# Patient Record
Sex: FEMALE | Race: WHITE | Employment: UNEMPLOYED | URBAN - METROPOLITAN AREA
[De-identification: names, ages, dates, MRNs, and addresses within clinical notes are randomized per-mention and may not be internally consistent; named-entity substitution may affect disease eponyms.]

---
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
## 2021-09-13 ENCOUNTER — NURSE TRIAGE (OUTPATIENT)
Dept: OTHER | Facility: CLINIC | Age: 21
End: 2021-09-13

## 2021-09-13 ENCOUNTER — OFFICE VISIT (OUTPATIENT)
Dept: PRIMARY CARE CLINIC | Age: 21
End: 2021-09-13
Payer: COMMERCIAL

## 2021-09-13 VITALS
HEART RATE: 83 BPM | WEIGHT: 171 LBS | OXYGEN SATURATION: 98 % | BODY MASS INDEX: 28.49 KG/M2 | HEIGHT: 65 IN | TEMPERATURE: 98 F | RESPIRATION RATE: 16 BRPM | DIASTOLIC BLOOD PRESSURE: 77 MMHG | SYSTOLIC BLOOD PRESSURE: 117 MMHG

## 2021-09-13 DIAGNOSIS — M79.671 RIGHT FOOT PAIN: Primary | ICD-10-CM

## 2021-09-13 PROCEDURE — 99213 OFFICE O/P EST LOW 20 MIN: CPT | Performed by: INTERNAL MEDICINE

## 2021-09-13 NOTE — PROGRESS NOTES
HISTORY OF PRESENT ILLNESS  Lashell Rodriguez is a 21 y.o. female. Patient reports that around 430 today she ran her foot over with a patient bed. Reports that continued to work. Has pain, but is able to walk ok. Did take Advil about 40 minutes prior to coming in. No swelling, bruising or loss of sensation. HPI    Review of Systems   Constitutional: Negative. Respiratory: Negative. Cardiovascular: Negative. Musculoskeletal: Positive for joint pain. Neurological: Negative for tingling and sensory change. Physical Exam  Vitals and nursing note reviewed. Cardiovascular:      Rate and Rhythm: Normal rate and regular rhythm. Pulmonary:      Effort: Pulmonary effort is normal.      Breath sounds: Normal breath sounds. Musculoskeletal:      Right foot: Normal range of motion and normal capillary refill. No swelling or deformity. Normal pulse. Comments: Reports some pain on internal rotation. Able to bear weight    Skin:     General: Skin is warm. Neurological:      Mental Status: She is alert and oriented to person, place, and time. ASSESSMENT and PLAN  Diagnoses and all orders for this visit:    1. Right foot pain     - take the remainder of the day off     - return home to rest, ICE and elevated. - encouraged to take NSAID 600-800 mg every 8 hours. - will apply foot ankle/brace for stability     Follow-up and Dispositions    · Return if symptoms worsen or fail to improve, for Follow up.        reviewed medications and side effects in detail

## 2021-09-13 NOTE — PROGRESS NOTES
Chief Complaint   Patient presents with    Foot Pain     Patient in room #5 with complaints of right foot pain after stretcher ran over foot today around 430 pm

## 2021-09-13 NOTE — TELEPHONE ENCOUNTER
Reason for Disposition   [1] Numbness (new loss of sensation) of toe(s) AND [2] present now    Answer Assessment - Initial Assessment Questions  1. MECHANISM: \"How did the injury happen? 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 ONSET: \"When did the injury happen? \" (Minutes or hours ago)       Approx. 90 mins ago    3. LOCATION: \"Where is the injury located? \"       Right foot, edge near pinky toes    4. APPEARANCE of INJURY: \"What does the injury look like? \"       Patient describes mild redness and swelling to the 4th and 5th toe    5. WEIGHT-BEARING: \"Can you put weight on that foot? \" \"Can you walk (four steps or more)? \"        Patient able to walk and put weight on the foot    6. SIZE: For cuts, bruises, or swelling, ask: \"How large is it? \" (e.g., inches or centimeters;  entire joint)      Extends the 4th toe and 5th toe    7. PAIN: \"Is there pain? \" If so, ask: \"How bad is the pain? \"    (e.g., Scale 1-10; or mild, moderate, severe)      5/10    8. TETANUS: For any breaks in the skin, ask: \"When was the last tetanus booster? \"      N/a    9. OTHER SYMPTOMS: \"Do you have any other symptoms? \"       Numbness in the 4th and 5th toe    10. PREGNANCY: \"Is there any chance you are pregnant? \" \"When was your last menstrual period? \"        n/a    Protocols used: ANKLE AND FOOT INJURY-ADULT-AH    Location of employment: [employer]     Location of injury (body part involved): Right foot    Time of injury: 4pm    Last 4 of SSN: [id number]    Triage indicates for caller to: Urgent Care    Caller directed to: Urgent Care within [facility] advice provided, caller verbalizes understanding; denies any other questions or concerns.

## 2021-11-03 ENCOUNTER — NURSE TRIAGE (OUTPATIENT)
Dept: OTHER | Facility: CLINIC | Age: 21
End: 2021-11-03

## 2021-11-03 NOTE — TELEPHONE ENCOUNTER
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 Getting patient moved and pedal on bed was broken. Time of injury: 11/2/2021     Last 4 of SSN: [id number]    Triage indicates for caller to perform home care. Caller directed to fill out packet sent in e-mail and follow steps in packet. Care advice provided, caller verbalizes understanding; denies any other questions or concerns. Reason for Disposition   Caused by strained muscle    Answer Assessment - Initial Assessment Questions  1. ONSET: \"When did the pain start? \"       Yesterday     2. LOCATION: \"Where is the pain located? \"       Right leg/ ankle     3. PAIN: \"How bad is the pain? \"    (Scale 1-10; or mild, moderate, severe)    -  MILD (1-3): doesn't interfere with normal activities     -  MODERATE (4-7): interferes with normal activities (e.g., work or school) or awakens from sleep, limping     -  SEVERE (8-10): excruciating pain, unable to do any normal activities, unable to walk      4/10     4. WORK OR EXERCISE: \"Has there been any recent work or exercise that involved this part of the body? \"       Yes - work     5. CAUSE: \"What do you think is causing the leg pain? \"      Putting pressure on pedal of bed and it did not click over     6. OTHER SYMPTOMS: \"Do you have any other symptoms? \" (e.g., chest pain, back pain, breathing difficulty, swelling, rash, fever, numbness, weakness)      Denies     7. PREGNANCY: \"Is there any chance you are pregnant? \" \"When was your last menstrual period? \"      Denies    Protocols used: LEG PAIN-ADULT-[place]

## 2023-05-12 RX ORDER — NORGESTIMATE AND ETHINYL ESTRADIOL 7DAYSX3 28
1 KIT ORAL DAILY
COMMUNITY

## 2023-05-12 RX ORDER — PREDNISONE 10 MG/1
TABLET ORAL
COMMUNITY
Start: 2018-03-25